# Patient Record
(demographics unavailable — no encounter records)

---

## 2025-05-29 NOTE — FAMILY HISTORY
[FreeTextEntry1] : - ADHD in siblings - Anxiety in mother and brother - Depression in mother and brother

## 2025-05-29 NOTE — SOCIAL HISTORY
[TextEntry] : - Lives at home with both parents and two siblings - Father is a  - Mother is a stay-at-home mom with an associate's degree - No pets at home - No smoking in the household - No guns in the home (father keeps service weapon at work)

## 2025-05-29 NOTE — REASON FOR VISIT
[Initial Consultation] : an initial consultation for [Recommendation for Intervention] : recommendation for intervention [Hyperactivity] : hyperactivity [Attention Problems] : attention problems

## 2025-05-29 NOTE — PLAN
[TextEntry] : # Suspected Attention-Deficit/Hyperactivity Disorder (ADHD) : Based on the reported symptoms of inattention, hyperactivity, and impulsivity in multiple settings (home and school), as well as the family history of ADHD in siblings, there is a strong suspicion of ADHD. However, a comprehensive evaluation is necessary for a definitive diagnosis. - Administer more extensive ADHD rating scales to both parent and teacher (YURIDIA,   questionnaire) - Conduct a brief IQ test and educational assessment during the next visit ([facility] 1st grade) - Evaluate the need for behavioral interventions or potential medication management based on comprehensive assessment results   # Seasonal Allergies : Gamaliel experiences seasonal allergies, currently managed with over-the-counter antihistamines. - Continue current management with Zyrtec and Claritin as needed

## 2025-05-29 NOTE — HISTORY OF PRESENT ILLNESS
[Mother] : mother [Other:____] : [unfilled] [Telehealth (audio & video)] : This visit was provided via telehealth using real-time 2-way audio visual technology. [TextEntry] : Gamaliel is a 6-year-old boy, who is being evaluated secondary to concerns of potential ADHD. History obtained from patient and Mother.  Due to age and/or developmental age, patient is unable to provide comprehensive history, requiring an independent historian.    Chief Concerns: - Difficulty concentrating - Getting out of seat - Mild defiance at school - Impulsivity (hit another child when upset) - Forgetfulness in daily routines  Early developmental milestones:: - Speech delay - received early intervention services - Cleared from speech services before entering 3-year-old program - No other significant early developmental concerns noted  Current developmental milestones: Language : - No current concerns with language development - Appears to understand age-appropriate instructions and concepts - May not always understand jokes or humor - sometimes laughs when he doesn't understand something  Socialization : - Has two good friends - No concerns with social interactions reported  Fine motor skills : - No concerns reported  Gross motor skills : - Previously participated in soccer, performed well - No current concerns reported  Sensory difference: - No sensory sensitivities or aversions reported  Academics: - Attends regular first-grade classroom - No current academic concerns - Reading skills reported as 'really good' - No concerns with comprehension - Teacher reports no academic concerns  Inattention/hyperactivity: - Difficulty concentrating - Gets out of seat frequently - Needs constant redirection during homework - Forgets daily routines (e.g., putting on seatbelt)  Homework  - takes about an hour to complete with redirection  Medications: - Zyrtec and Claritin for seasonal allergies  Anxiety: - No signs of anxiety reported  Aggression: - One incident of hitting another child at school when upset  General: Diet : - Picky eater - Does not eat vegetables - Recently started eating some fruits (strawberries) - Primarily eats carbohydrates (waffles, cereal) - Has started eating some chicken - Consumes dairy (yogurt, milk)  Sleep : - Bedtime at 7:00 PM - Wakes up around 6:00 AM - Gets approximately 11 hours of sleep - Sometimes wakes up and comes to parents' bed - Moves around in sleep - No snoring reported - No melatonin or sleep aids used  Elimination habits : No constipation reported, described as 'pretty regular'  Exercise/physical use/hobbies: - Previously participated in soccer - Currently no after-school activities - Parents considering [employer] or similar activity  Medical problems: - Hearing Assessment : Regular check-ups by PCP , no concerns reported - Vision Assessment : Regular check-ups by PCP, no concerns reported - PCP : Dr. Saldaña - Previous Workup : Early speech evaluation and intervention - Dentist :     Allergies : - Seasonal allergies    Vitamins : No vitamins reported   Yvonne strengths : Good at soccer, academically on track Previous Evaluations: - Early speech evaluation resulting in early intervention services

## 2025-06-12 NOTE — PLAN
[TextEntry] :     # Suspected Attention-Deficit/Hyperactivity Disorder (ADHD) : Based on the reported symptoms of inattention, hyperactivity, and impulsivity in multiple settings (home and school), as well as the family history of ADHD in siblings, there is a strong suspicion of ADHD. However, a comprehensive evaluation is necessary for a definitive diagnosis. - Administer more extensive ADHD rating scales to both parent and teacher (YURIDIA,  questionnaire) - Conduct a brief IQ test and educational assessment during the next visit ([facility] 1st grade) - Evaluate the need for behavioral interventions or potential medication management based on comprehensive assessment results  # Seasonal Allergies : Gamaliel experiences seasonal allergies, currently managed with over-the-counter antihistamines. - Continue current management with Zyrtec and Claritin as needed

## 2025-06-12 NOTE — HISTORY OF PRESENT ILLNESS
[TextEntry] : Gamaliel is a 6-year-old boy, who is being evaluated secondary to concerns of potential ADHD. History obtained from patient and Mother. Due to age and/or developmental age, patient is unable to provide comprehensive history, requiring an independent historian.  Chief Concerns: - Difficulty concentrating - Getting out of seat - Mild defiance at school - Impulsivity (hit another child when upset) - Forgetfulness in daily routines  Early developmental milestones:: - Speech delay - received early intervention services - Cleared from speech services before entering 3-year-old program - No other significant early developmental concerns noted  Current developmental milestones: Language : - No current concerns with language development - Appears to understand age-appropriate instructions and concepts - May not always understand jokes or humor - sometimes laughs when he doesn't understand something  Socialization : - Has two good friends - No concerns with social interactions reported  Fine motor skills : - No concerns reported  Gross motor skills : - Previously participated in soccer, performed well - No current concerns reported  Sensory difference: - No sensory sensitivities or aversions reported  Academics: - Attends regular first-grade classroom - No current academic concerns - Reading skills reported as 'really good' - No concerns with comprehension - Teacher reports no academic concerns  Inattention/hyperactivity: - Difficulty concentrating - Gets out of seat frequently - Needs constant redirection during homework - Forgets daily routines (e.g., putting on seatbelt)  Homework - takes about an hour to complete with redirection  Medications: - Zyrtec and Claritin for seasonal allergies  Anxiety: - No signs of anxiety reported  Aggression: - One incident of hitting another child at school when upset  General: Diet : - Picky eater - Does not eat vegetables - Recently started eating some fruits (strawberries) - Primarily eats carbohydrates (waffles, cereal) - Has started eating some chicken - Consumes dairy (yogurt, milk)  Sleep : - Bedtime at 7:00 PM - Wakes up around 6:00 AM - Gets approximately 11 hours of sleep - Sometimes wakes up and comes to parents' bed - Moves around in sleep - No snoring reported - No melatonin or sleep aids used  Elimination habits : No constipation reported, described as 'pretty regular'  Exercise/physical use/hobbies: - Previously participated in soccer - Currently no after-school activities - Parents considering [employer] or similar activity  Medical problems: - Hearing Assessment : Regular check-ups by PCP , no concerns reported - Vision Assessment : Regular check-ups by PCP, no concerns reported - PCP : Dr. Saldaña - Previous Workup : Early speech evaluation and intervention - Dentist :  Allergies : - Seasonal allergies  Vitamins : No vitamins reported  Yvonne strengths : Good at soccer, academically on track Previous Evaluations: - Early speech evaluation resulting in early intervention services

## 2025-06-12 NOTE — HISTORY OF PRESENT ILLNESS
[TextEntry] : Gamaliel is a 6-year-old boy, who is being evaluated secondary to concerns of potential ADHD. History obtained from patient and Mother. Due to age and/or developmental age, patient is unable to provide comprehensive history, requiring an independent historian.  Chief Concerns: - Difficulty concentrating - Getting out of seat - Mild defiance at school - Impulsivity (hit another child when upset) - Forgetfulness in daily routines  Early developmental milestones:: - Speech delay - received early intervention services - Cleared from speech services before entering 3-year-old program - No other significant early developmental concerns noted  Current developmental milestones: Language : - No current concerns with language development - Appears to understand age-appropriate instructions and concepts - May not always understand jokes or humor - sometimes laughs when he doesn't understand something  Socialization : - Has two good friends - No concerns with social interactions reported  Fine motor skills : - No concerns reported  Gross motor skills : - Previously participated in soccer, performed well - No current concerns reported  Sensory difference: - No sensory sensitivities or aversions reported  Academics: - Attends regular first-grade classroom - No current academic concerns - Reading skills reported as 'really good' - No concerns with comprehension - Teacher reports no academic concerns  Inattention/hyperactivity: - Difficulty concentrating - Gets out of seat frequently - Needs constant redirection during homework - Forgets daily routines (e.g., putting on seatbelt)  Homework - takes about an hour to complete with redirection  Medications: - Zyrtec and Claritin for seasonal allergies  Anxiety: - No signs of anxiety reported  Aggression: - One incident of hitting another child at school when upset  General: Diet : - Picky eater - Does not eat vegetables - Recently started eating some fruits (strawberries) - Primarily eats carbohydrates (waffles, cereal) - Has started eating some chicken - Consumes dairy (yogurt, milk)  Sleep : - Bedtime at 7:00 PM - Wakes up around 6:00 AM - Gets approximately 11 hours of sleep - Sometimes wakes up and comes to parents' bed - Moves around in sleep - No snoring reported - No melatonin or sleep aids used  Elimination habits : No constipation reported, described as 'pretty regular'  Exercise/physical use/hobbies: - Previously participated in soccer - Currently no after-school activities - [facility] or similar activity  Medical problems: - Hearing Assessment : Regular check-ups by PCP , no concerns reported - Vision Assessment : Regular check-ups by PCP, no concerns reported - PCP : Dr. Saldaña - Previous Workup : Early speech evaluation and intervention - Dentist :  Allergies : - Seasonal allergies  Vitamins : No vitamins reported  Yvonne strengths : Good at soccer, academically on track Previous Evaluations: - Early speech evaluation resulting in early intervention services

## 2025-06-12 NOTE — HISTORY OF PRESENT ILLNESS
[TextEntry] : Gamaliel is a 6-year-old boy, who is being evaluated secondary to concerns of potential ADHD. History obtained from patient and Mother. Due to age and/or developmental age, patient is unable to provide comprehensive history, requiring an independent historian.  Chief Concerns: - Difficulty concentrating - Getting out of seat - Mild defiance at school - Impulsivity (hit another child when upset) - Forgetfulness in daily routines  Early developmental milestones:: - Speech delay - received early intervention services - Cleared from speech services before entering 3-year-old program - No other significant early developmental concerns noted  Current developmental milestones: Language : - No current concerns with language development - Appears to understand age-appropriate instructions and concepts - May not always understand jokes or humor - sometimes laughs when he doesn't understand something  Socialization : - Has two good friends - No concerns with social interactions reported  Fine motor skills : - No concerns reported  Gross motor skills : - Previously participated in soccer, performed well - No current concerns reported  Sensory difference: - No sensory sensitivities or aversions reported  Academics: - Attends regular first-grade classroom - No current academic concerns - Reading skills reported as 'really good' - No concerns with comprehension - Teacher reports no academic concerns  Inattention/hyperactivity: - Difficulty concentrating - Gets out of seat frequently - Needs constant redirection during homework - Forgets daily routines (e.g., putting on seatbelt)  Homework - takes about an hour to complete with redirection  Medications: - Zyrtec and Claritin for seasonal allergies  Anxiety: - No signs of anxiety reported  Aggression: - One incident of hitting another child at school when upset  General: Diet : - Picky eater - Does not eat vegetables - Recently started eating some fruits (strawberries) - Primarily eats carbohydrates (waffles, cereal) - Has started eating some chicken - Consumes dairy (yogurt, milk)  Sleep : - Bedtime at 7:00 PM - Wakes up around 6:00 AM - Gets approximately 11 hours of sleep - Sometimes wakes up and comes to parents' bed - Moves around in sleep - No snoring reported - No melatonin or sleep aids used  Elimination habits : No constipation reported, described as 'pretty regular'  Exercise/physical use/hobbies: - Previously participated in soccer - Currently no after-school activities - Parents considering [employer] or similar activity  Medical problems: - Hearing Assessment : Regular check-ups by PCP , no concerns reported - Vision Assessment : Regular check-ups by PCP, no concerns reported - PCP : Dr. Saldaña - Previous Workup : Early speech evaluation and intervention - Dentist :  Allergies : - Seasonal allergies  Vitamins : No vitamins reported  Yvonne strengths : Good at soccer, academically on track Previous Evaluations: - Early speech evaluation resulting in early intervention services

## 2025-06-12 NOTE — HISTORY OF PRESENT ILLNESS
[TextEntry] : Gamaliel is a 6-year-old boy, who is being evaluated secondary to concerns of potential ADHD. History obtained from patient and Mother. Due to age and/or developmental age, patient is unable to provide comprehensive history, requiring an independent historian.  Chief Concerns: - Difficulty concentrating - Getting out of seat - Mild defiance at school - Impulsivity (hit another child when upset) - Forgetfulness in daily routines  Early developmental milestones:: - Speech delay - received early intervention services - Cleared from speech services before entering 3-year-old program - No other significant early developmental concerns noted  Current developmental milestones: Language : - No current concerns with language development - Appears to understand age-appropriate instructions and concepts - May not always understand jokes or humor - sometimes laughs when he doesn't understand something  Socialization : - Has two good friends - No concerns with social interactions reported  Fine motor skills : - No concerns reported  Gross motor skills : - Previously participated in soccer, performed well - No current concerns reported  Sensory difference: - No sensory sensitivities or aversions reported  Academics: - Attends regular first-grade classroom - No current academic concerns - Reading skills reported as 'really good' - No concerns with comprehension - Teacher reports no academic concerns  Inattention/hyperactivity: - Difficulty concentrating - Gets out of seat frequently - Needs constant redirection during homework - Forgets daily routines (e.g., putting on seatbelt)  Homework - takes about an hour to complete with redirection  Medications: - Zyrtec and Claritin for seasonal allergies  Anxiety: - No signs of anxiety reported  Aggression: - One incident of hitting another child at school when upset  General: Diet : - Picky eater - Does not eat vegetables - Recently started eating some fruits (strawberries) - Primarily eats carbohydrates (waffles, cereal) - Has started eating some chicken - Consumes dairy (yogurt, milk)  Sleep : - Bedtime at 7:00 PM - Wakes up around 6:00 AM - Gets approximately 11 hours of sleep - Sometimes wakes up and comes to parents' bed - Moves around in sleep - No snoring reported - No melatonin or sleep aids used  Elimination habits : No constipation reported, described as 'pretty regular'  Exercise/physical use/hobbies: - Previously participated in soccer - Currently no after-school activities - Parents considering [employer] or similar activity  Medical problems: - Hearing Assessment : Regular check-ups by PCP , no concerns reported - Vision Assessment : Regular check-ups by PCP, no concerns reported - PCP : Dr. Saldaña - Previous Workup : Early speech evaluation and intervention - Dentist :  Allergies : - Seasonal allergies  Vitamins : No vitamins reported  Yvonne strengths : Good at soccer, academically on track Previous Evaluations: - Early speech evaluation resulting in early intervention services

## 2025-06-12 NOTE — PLAN
[TextEntry] :     # Suspected Attention-Deficit/Hyperactivity Disorder (ADHD) : Based on the reported symptoms of inattention, hyperactivity, and impulsivity in multiple settings (home and school), as well as the family history of ADHD in siblings, there is a strong suspicion of ADHD. However, a comprehensive evaluation is necessary for a definitive diagnosis. - Administer more extensive ADHD rating scales to both parent and teacher (YURIDIA,  questionnaire) - Conduct a brief IQ test and educational assessment during the next visit ([facility] 1st grade) - Evaluate the need for behavioral interventions or potential medication management based on comprehensive assessment results  # Seasonal Allergies : Gamaliel experiences seasonal allergies, currently managed with over-the-counter antihistamines. - Continue current management with Zyrtec and Claritin as needed

## 2025-06-12 NOTE — PLAN
[TextEntry] :     # Suspected Attention-Deficit/Hyperactivity Disorder (ADHD) : Based on the reported symptoms of inattention, hyperactivity, and impulsivity in multiple settings (home and school), as well as the family history of ADHD in siblings, there is a strong suspicion of ADHD. However, a comprehensive evaluation is necessary for a definitive diagnosis. - Administer more extensive ADHD rating scales to both parent and teacher (YURIDIA,  questionnaire) - Conduct a brief IQ test and educational assessment during the next visit ([facility] 1st grade) - Evaluate the need for behavioral interventions or potential medication management based on comprehensive assessment results  # Seasonal Allergies : Gamaliel experiences seasonal allergies, currently managed with over-the-counter antihistamines. - Continue current management with Zyrtec and Claritin as needed

## 2025-06-12 NOTE — PLAN
[TextEntry] :     # Suspected Attention-Deficit/Hyperactivity Disorder (ADHD) : Based on the reported symptoms of inattention, hyperactivity, and impulsivity in multiple settings (home and school), as well as the family history of ADHD in siblings, there is a strong suspicion of ADHD. However, a comprehensive evaluation is necessary for a definitive diagnosis. - Administer more extensive ADHD rating scales to both parent and teacher (YURIDIA,  questionnaire) - Conduct a brief IQ test and educational assessment during the next visit ([facility] 1st grade) - Evaluate the need for behavioral interventions or potential medication management based on comprehensive assessment results  # Seasonal Allergies : Gmaaliel experiences seasonal allergies, currently managed with over-the-counter antihistamines. - Continue current management with Zyrtec and Claritin as needed